# Patient Record
Sex: FEMALE | Race: WHITE
[De-identification: names, ages, dates, MRNs, and addresses within clinical notes are randomized per-mention and may not be internally consistent; named-entity substitution may affect disease eponyms.]

---

## 2018-02-13 ENCOUNTER — HOSPITAL ENCOUNTER (OUTPATIENT)
Dept: HOSPITAL 88 - CT | Age: 73
End: 2018-02-13
Attending: PHYSICAL MEDICINE & REHABILITATION
Payer: MEDICARE

## 2018-02-13 DIAGNOSIS — M54.16: Primary | ICD-10-CM

## 2018-02-13 DIAGNOSIS — M54.5: ICD-10-CM

## 2018-02-13 PROCEDURE — 72131 CT LUMBAR SPINE W/O DYE: CPT

## 2018-02-16 NOTE — DIAGNOSTIC IMAGING REPORT
Examination: CT LUMBAR SPINE WITHOUT CONTRAST



History: Low back pain with occasional numbness and tingling of the

extremities.

Comparison studies: None



Technique: 

Axial images were obtained through the lumbar spine from L1.

Coronal and sagittal reconstructions obtained from the axial data.

Intravenous contrast: None



Findings:



The usual 5 non-rib bearing lumbar vertebral bodies are present.

Alignment: Normal lordosis.  No scoliosis.

Soft tissues: Atherosclerotic calcification of the abdominal aorta and its

branches.

Paraspinal muscles: No abnormalities.

Sacroiliac joints: Vacuum phenomenon bilaterally, related to degenerative

change.



Vertebrae:  

No fractures, infection or neoplasm.



Degenerative changes:



L1-L2:

No abnormalities.



L2-L3:

Left central and subarticular disc protrusion superimposed on a diffuse disc

bulge and mild ligamentum flavum thickening.

No foraminal or canal stenosis.



L3-L4:

Diffuse disc bulge.

No foraminal or canal stenosis.



L4-L5:

Vacuum phenomenon in the intervertebral disc.

Grade I anterolisthesis without pars defect.

Uncovering of a diffuse disc bulge, mild ligamentum flavum thickening and

moderate bilateral facet arthropathy with vacuum phenomenon result in mild

right and severe left neural foraminal narrowing and severe canal stenosis.



L5-S1:

Vacuum phenomenon in the intervertebral disc.

Diffuse disc bulge, mild ligamentum flavum thickening and mild bilateral facet

arthropathy result in moderate right and mild left neural foraminal narrowing.

No canal stenosis.



IMPRESSION:



1.  Degenerative changes of L2-L3 through L5-S1 with severe canal and severe

left foraminal narrowing at L4-L5.



2.  Degenerative grade I anterolisthesis at L4-L5.



3.  Moderate right foraminal narrowing at L5-S1.





Signed by: Dr. Rachelle El M.D. on 2/16/2018 9:19 AM

## 2018-05-01 LAB
BASOPHILS # BLD AUTO: 0.1 10*3/UL (ref 0–0.1)
BASOPHILS NFR BLD AUTO: 0.6 % (ref 0–1)
DEPRECATED NEUTROPHILS # BLD AUTO: 5.7 10*3/UL (ref 2.1–6.9)
EOSINOPHIL # BLD AUTO: 0.2 10*3/UL (ref 0–0.4)
EOSINOPHIL NFR BLD AUTO: 2.5 % (ref 0–6)
ERYTHROCYTE [DISTWIDTH] IN CORD BLOOD: 13.7 % (ref 11.7–14.4)
HCT VFR BLD AUTO: 34.5 % (ref 34.2–44.1)
HGB BLD-MCNC: 12.2 G/DL (ref 12–16)
LYMPHOCYTES # BLD: 2.7 10*3/UL (ref 1–3.2)
LYMPHOCYTES NFR BLD AUTO: 28.8 % (ref 18–39.1)
MCH RBC QN AUTO: 30.4 PG (ref 28–32)
MCHC RBC AUTO-ENTMCNC: 35.4 G/DL (ref 31–35)
MCV RBC AUTO: 86 FL (ref 81–99)
MONOCYTES # BLD AUTO: 0.6 10*3/UL (ref 0.2–0.8)
MONOCYTES NFR BLD AUTO: 6.4 % (ref 4.4–11.3)
NEUTS SEG NFR BLD AUTO: 61.2 % (ref 38.7–80)
PLATELET # BLD AUTO: 258 X10E3/UL (ref 140–360)
RBC # BLD AUTO: 4.01 X10E6/UL (ref 3.6–5.1)

## 2018-05-03 ENCOUNTER — HOSPITAL ENCOUNTER (OUTPATIENT)
Dept: HOSPITAL 88 - OR | Age: 73
Discharge: HOME | End: 2018-05-03
Attending: PHYSICAL MEDICINE & REHABILITATION
Payer: MEDICARE

## 2018-05-03 DIAGNOSIS — Z88.5: ICD-10-CM

## 2018-05-03 DIAGNOSIS — M54.16: ICD-10-CM

## 2018-05-03 DIAGNOSIS — Z01.810: ICD-10-CM

## 2018-05-03 DIAGNOSIS — Z96.641: ICD-10-CM

## 2018-05-03 DIAGNOSIS — Z01.812: ICD-10-CM

## 2018-05-03 DIAGNOSIS — Z79.82: ICD-10-CM

## 2018-05-03 DIAGNOSIS — M47.817: ICD-10-CM

## 2018-05-03 DIAGNOSIS — M13.849: ICD-10-CM

## 2018-05-03 DIAGNOSIS — E03.9: ICD-10-CM

## 2018-05-03 DIAGNOSIS — M46.1: ICD-10-CM

## 2018-05-03 DIAGNOSIS — I12.9: ICD-10-CM

## 2018-05-03 DIAGNOSIS — M16.0: ICD-10-CM

## 2018-05-03 DIAGNOSIS — M48.061: ICD-10-CM

## 2018-05-03 DIAGNOSIS — N18.9: ICD-10-CM

## 2018-05-03 DIAGNOSIS — M47.816: Primary | ICD-10-CM

## 2018-05-03 PROCEDURE — 64493 INJ PARAVERT F JNT L/S 1 LEV: CPT

## 2018-05-03 PROCEDURE — 85025 COMPLETE CBC W/AUTO DIFF WBC: CPT

## 2018-05-03 PROCEDURE — 93005 ELECTROCARDIOGRAM TRACING: CPT

## 2018-05-03 PROCEDURE — 77003 FLUOROGUIDE FOR SPINE INJECT: CPT

## 2018-05-03 PROCEDURE — 36415 COLL VENOUS BLD VENIPUNCTURE: CPT

## 2018-05-03 PROCEDURE — 64494 INJ PARAVERT F JNT L/S 2 LEV: CPT

## 2018-05-03 PROCEDURE — 64495 INJ PARAVERT F JNT L/S 3 LEV: CPT

## 2018-05-03 NOTE — XMS REPORT
Patient Summary Document

 Created on: 2018



CASE VALENTE

External Reference #: [id number]

: 1945

Sex: Female



Demographics







 Address  [address] [place] [address]

 

 Home Phone  [phone]

 

 Preferred Language  Unknown

 

 Marital Status  Unknown

 

 Yarsanism Affiliation  Unknown

 

 Race  Unknown

 

 Ethnic Group  Unknown





Author







 Author  [facility]

 

 Address  Unknown

 

 Phone  Unavailable







Care Team Providers







 Care Team Member Name  Role  Phone

 

 RICHIE ALEX  Unavailable  Unavailable







Problems

This patient has no known problems.



Allergies, Adverse Reactions, Alerts

This patient has no known allergies or adverse reactions.



Medications

This patient has no known medications.



Results







 Test Description  Test Time  Test Comments  Text Results  Atomic Results  
Result Comments









 CT LUMBAR SPINE WO            [place] [address]      Patient Name: CASE VALENTE   MR #: [id number]    : 1945 Age/Sex: 73/F  Acct #: 
[id number] Req #: [id number]  Adm Physician:     Ordered by: RICHIE ALEX MD  Report #: [id number]   Location: CT  Room/Bed:     _______________
________________________________________________________________________________
____    Procedure: [id number] CT/CT LUMBAR SPINE WO  Exam Date: 18        
                    Exam Time: 1115       REPORT STATUS: Signed    Examination: 
CT LUMBAR SPINE WITHOUT CONTRAST      History: Low back pain with occasional 
numbness and tingling of the   extremities.   Comparison studies: None      
Technique:    Axial images were obtained through the lumbar spine from L1.   
Coronal and sagittal reconstructions obtained from the axial data.   
Intravenous contrast: None      Findings:      The usual 5 non-rib bearing 
lumbar vertebral bodies are present.   Alignment: Normal lordosis.  No 
scoliosis.   Soft tissues: Atherosclerotic calcification of the abdominal aorta 
and its   branches.   Paraspinal muscles: No abnormalities.   Sacroiliac joints
: Vacuum phenomenon bilaterally, related to degenerative   change.      
Vertebrae:     No fractures, infection or neoplasm.      Degenerative changes: 
     L1-L2:   No abnormalities.      L2-L3:   Left central and subarticular 
disc protrusion superimposed on a diffuse disc   bulge and mild ligamentum 
flavum thickening.   No foraminal or canal stenosis.      L3-L4:   Diffuse disc 
bulge.   No foraminal or canal stenosis.      L4-L5:   Vacuum phenomenon in the 
intervertebral disc.   Grade I anterolisthesis without pars defect.   
Uncovering of a diffuse disc bulge, mild ligamentum flavum thickening and   
moderate bilateral facet arthropathy with vacuum phenomenon result in mild   
right and severe left neural foraminal narrowing and severe canal stenosis.    
  L5-S1:   Vacuum phenomenon in the intervertebral disc.   Diffuse disc bulge, 
mild ligamentum flavum thickening and mild bilateral facet   arthropathy result 
in moderate right and mild left neural foraminal narrowing.   No canal 
stenosis.      IMPRESSION:      1.  Degenerative changes of L2-L3 through L5-S1 
with severe canal and severe   left foraminal narrowing at L4-L5.      2.  
Degenerative grade I anterolisthesis at L4-L5.      3.  Moderate right 
foraminal narrowing at L5-S1.         Signed by: Dr. Rachelle El M.D. on  9:19 AM        Dictated By: RCAHELLE MCCRAY MD  
Electronically Signed By: RACHELLE MCCRAY MD on 18  
Transcribed By: KYLEIGH on 18       COPY TO:   RICHIE ALEX MD

## 2018-08-01 LAB
BASOPHILS # BLD AUTO: 0.1 10*3/UL (ref 0–0.1)
BASOPHILS NFR BLD AUTO: 0.5 % (ref 0–1)
DEPRECATED NEUTROPHILS # BLD AUTO: 6.6 10*3/UL (ref 2.1–6.9)
EOSINOPHIL # BLD AUTO: 0.3 10*3/UL (ref 0–0.4)
EOSINOPHIL NFR BLD AUTO: 3.1 % (ref 0–6)
ERYTHROCYTE [DISTWIDTH] IN CORD BLOOD: 13.9 % (ref 11.7–14.4)
HCT VFR BLD AUTO: 34.8 % (ref 34.2–44.1)
HGB BLD-MCNC: 11.8 G/DL (ref 12–16)
LYMPHOCYTES # BLD: 2.3 10*3/UL (ref 1–3.2)
LYMPHOCYTES NFR BLD AUTO: 23.3 % (ref 18–39.1)
MCH RBC QN AUTO: 30.7 PG (ref 28–32)
MCHC RBC AUTO-ENTMCNC: 33.9 G/DL (ref 31–35)
MCV RBC AUTO: 90.6 FL (ref 81–99)
MONOCYTES # BLD AUTO: 0.6 10*3/UL (ref 0.2–0.8)
MONOCYTES NFR BLD AUTO: 5.6 % (ref 4.4–11.3)
NEUTS SEG NFR BLD AUTO: 67 % (ref 38.7–80)
PLATELET # BLD AUTO: 256 X10E3/UL (ref 140–360)
RBC # BLD AUTO: 3.84 X10E6/UL (ref 3.6–5.1)

## 2018-08-02 ENCOUNTER — HOSPITAL ENCOUNTER (OUTPATIENT)
Dept: HOSPITAL 88 - OR | Age: 73
Discharge: HOME | End: 2018-08-02
Attending: PHYSICAL MEDICINE & REHABILITATION
Payer: MEDICARE

## 2018-08-02 DIAGNOSIS — M19.041: ICD-10-CM

## 2018-08-02 DIAGNOSIS — M16.0: ICD-10-CM

## 2018-08-02 DIAGNOSIS — M47.26: Primary | ICD-10-CM

## 2018-08-02 DIAGNOSIS — Z01.812: ICD-10-CM

## 2018-08-02 DIAGNOSIS — M46.1: ICD-10-CM

## 2018-08-02 DIAGNOSIS — N18.9: ICD-10-CM

## 2018-08-02 DIAGNOSIS — M19.042: ICD-10-CM

## 2018-08-02 DIAGNOSIS — Z96.641: ICD-10-CM

## 2018-08-02 DIAGNOSIS — E03.9: ICD-10-CM

## 2018-08-02 DIAGNOSIS — Z98.84: ICD-10-CM

## 2018-08-02 DIAGNOSIS — Z88.5: ICD-10-CM

## 2018-08-02 DIAGNOSIS — M48.061: ICD-10-CM

## 2018-08-02 DIAGNOSIS — I12.9: ICD-10-CM

## 2018-08-02 PROCEDURE — 64484 NJX AA&/STRD TFRM EPI L/S EA: CPT

## 2018-08-02 PROCEDURE — 36415 COLL VENOUS BLD VENIPUNCTURE: CPT

## 2018-08-02 PROCEDURE — 64483 NJX AA&/STRD TFRM EPI L/S 1: CPT

## 2018-08-02 PROCEDURE — 85025 COMPLETE CBC W/AUTO DIFF WBC: CPT

## 2018-08-02 PROCEDURE — 77003 FLUOROGUIDE FOR SPINE INJECT: CPT
